# Patient Record
Sex: MALE | Employment: OTHER | URBAN - METROPOLITAN AREA
[De-identification: names, ages, dates, MRNs, and addresses within clinical notes are randomized per-mention and may not be internally consistent; named-entity substitution may affect disease eponyms.]

---

## 2024-03-20 ENCOUNTER — E-VISIT (OUTPATIENT)
Dept: PRIMARY CARE | Facility: CLINIC | Age: 44
End: 2024-03-20

## 2024-03-20 DIAGNOSIS — H01.119 CONTACT DERMATITIS OF EYELID, UNSPECIFIED LATERALITY: Primary | ICD-10-CM

## 2024-03-20 PROCEDURE — 99422 OL DIG E/M SVC 11-20 MIN: CPT

## 2024-03-20 RX ORDER — ALCLOMETASONE DIPROPIONATE 0.5 MG/G
OINTMENT TOPICAL 2 TIMES DAILY
Qty: 45 G | Refills: 0 | Status: SHIPPED | OUTPATIENT
Start: 2024-03-20

## 2024-03-20 NOTE — TELEPHONE ENCOUNTER
This visit was completed via virtual/eforms and photo submission. All issues as below were discussed and addressed but no physical exam was performed. If it was felt that the patient should be evaluated in clinic than they were directed there. The patient verbally consented to the visit.    Dermatitis: Patient complains of a rash. Symptoms began a few days ago. Patient describes the rash as erythematous, localized. Characteristics of rash and associated history: Similar rash in the past? yes. Patient's previous dermatologic history includes dermatitis and hives. Family history of derm problems: dermatitis. Medications currently using: steroids: aclovate . Environmental exposures or allergies: none     Joao was seen today for rash.  Diagnoses and all orders for this visit:  Contact dermatitis of eyelid, unspecified laterality  -     alclometasone (Aclovate) 0.05 % ointment; Apply topically 2 times a day.

## 2024-03-21 ENCOUNTER — APPOINTMENT (OUTPATIENT)
Dept: DERMATOLOGY | Facility: CLINIC | Age: 44
End: 2024-03-21

## 2024-03-27 ENCOUNTER — APPOINTMENT (OUTPATIENT)
Dept: DERMATOLOGY | Facility: CLINIC | Age: 44
End: 2024-03-27